# Patient Record
Sex: FEMALE | Race: BLACK OR AFRICAN AMERICAN | NOT HISPANIC OR LATINO | URBAN - METROPOLITAN AREA
[De-identification: names, ages, dates, MRNs, and addresses within clinical notes are randomized per-mention and may not be internally consistent; named-entity substitution may affect disease eponyms.]

---

## 2023-09-07 ENCOUNTER — HOSPITAL ENCOUNTER (EMERGENCY)
Facility: HOSPITAL | Age: 23
Discharge: HOME | End: 2023-09-08
Attending: EMERGENCY MEDICINE
Payer: MEDICARE

## 2023-09-07 DIAGNOSIS — E86.0 DEHYDRATION: Primary | ICD-10-CM

## 2023-09-07 DIAGNOSIS — R00.0 TACHYCARDIA: ICD-10-CM

## 2023-09-07 LAB
ALBUMIN SERPL BCP-MCNC: 4.4 G/DL (ref 3.5–5.2)
ALP SERPL-CCNC: 145 U/L (ref 55–135)
ALT SERPL W/O P-5'-P-CCNC: 8 U/L (ref 10–44)
ANION GAP SERPL CALC-SCNC: 12 MMOL/L (ref 8–16)
AST SERPL-CCNC: 15 U/L (ref 10–40)
BASOPHILS # BLD AUTO: 0.02 K/UL (ref 0–0.2)
BASOPHILS NFR BLD: 0.2 % (ref 0–1.9)
BILIRUB SERPL-MCNC: 0.3 MG/DL (ref 0.1–1)
BNP SERPL-MCNC: <10 PG/ML (ref 0–99)
BUN SERPL-MCNC: 9 MG/DL (ref 6–20)
CALCIUM SERPL-MCNC: 9.5 MG/DL (ref 8.7–10.5)
CHLORIDE SERPL-SCNC: 108 MMOL/L (ref 95–110)
CO2 SERPL-SCNC: 18 MMOL/L (ref 23–29)
CREAT SERPL-MCNC: 0.8 MG/DL (ref 0.5–1.4)
DIFFERENTIAL METHOD: ABNORMAL
EOSINOPHIL # BLD AUTO: 0 K/UL (ref 0–0.5)
EOSINOPHIL NFR BLD: 0.2 % (ref 0–8)
ERYTHROCYTE [DISTWIDTH] IN BLOOD BY AUTOMATED COUNT: 12.2 % (ref 11.5–14.5)
EST. GFR  (NO RACE VARIABLE): >60 ML/MIN/1.73 M^2
GLUCOSE SERPL-MCNC: 96 MG/DL (ref 70–110)
HCT VFR BLD AUTO: 34.8 % (ref 37–48.5)
HGB BLD-MCNC: 11.4 G/DL (ref 12–16)
IMM GRANULOCYTES # BLD AUTO: 0.04 K/UL (ref 0–0.04)
IMM GRANULOCYTES NFR BLD AUTO: 0.4 % (ref 0–0.5)
LYMPHOCYTES # BLD AUTO: 0.7 K/UL (ref 1–4.8)
LYMPHOCYTES NFR BLD: 8 % (ref 18–48)
MCH RBC QN AUTO: 28.1 PG (ref 27–31)
MCHC RBC AUTO-ENTMCNC: 32.8 G/DL (ref 32–36)
MCV RBC AUTO: 86 FL (ref 82–98)
MONOCYTES # BLD AUTO: 0.8 K/UL (ref 0.3–1)
MONOCYTES NFR BLD: 9.1 % (ref 4–15)
NEUTROPHILS # BLD AUTO: 7.3 K/UL (ref 1.8–7.7)
NEUTROPHILS NFR BLD: 82.1 % (ref 38–73)
NRBC BLD-RTO: 0 /100 WBC
PLATELET # BLD AUTO: 302 K/UL (ref 150–450)
PMV BLD AUTO: 9.1 FL (ref 9.2–12.9)
POTASSIUM SERPL-SCNC: 3.6 MMOL/L (ref 3.5–5.1)
PROT SERPL-MCNC: 7.4 G/DL (ref 6–8.4)
RBC # BLD AUTO: 4.05 M/UL (ref 4–5.4)
SODIUM SERPL-SCNC: 138 MMOL/L (ref 136–145)
TROPONIN I SERPL DL<=0.01 NG/ML-MCNC: 0.01 NG/ML (ref 0–0.03)
WBC # BLD AUTO: 8.89 K/UL (ref 3.9–12.7)

## 2023-09-07 PROCEDURE — 99285 EMERGENCY DEPT VISIT HI MDM: CPT | Mod: 25

## 2023-09-07 PROCEDURE — 93010 EKG 12-LEAD: ICD-10-PCS | Mod: ,,, | Performed by: INTERNAL MEDICINE

## 2023-09-07 PROCEDURE — 83880 ASSAY OF NATRIURETIC PEPTIDE: CPT | Performed by: NURSE PRACTITIONER

## 2023-09-07 PROCEDURE — 84484 ASSAY OF TROPONIN QUANT: CPT | Performed by: NURSE PRACTITIONER

## 2023-09-07 PROCEDURE — 80053 COMPREHEN METABOLIC PANEL: CPT | Performed by: NURSE PRACTITIONER

## 2023-09-07 PROCEDURE — 93005 ELECTROCARDIOGRAM TRACING: CPT

## 2023-09-07 PROCEDURE — 85025 COMPLETE CBC W/AUTO DIFF WBC: CPT | Performed by: NURSE PRACTITIONER

## 2023-09-07 PROCEDURE — 93010 ELECTROCARDIOGRAM REPORT: CPT | Mod: ,,, | Performed by: INTERNAL MEDICINE

## 2023-09-07 NOTE — Clinical Note
Freddy accompanied their spouse to the emergency department on 9/7/2023. They may return to work on 09/09/2023.      If you have any questions or concerns, please don't hesitate to call.      Adilia Renteria RN

## 2023-09-08 VITALS
TEMPERATURE: 99 F | WEIGHT: 105.81 LBS | OXYGEN SATURATION: 100 % | SYSTOLIC BLOOD PRESSURE: 135 MMHG | HEIGHT: 61 IN | BODY MASS INDEX: 19.98 KG/M2 | RESPIRATION RATE: 18 BRPM | DIASTOLIC BLOOD PRESSURE: 88 MMHG | HEART RATE: 109 BPM

## 2023-09-08 PROCEDURE — 96360 HYDRATION IV INFUSION INIT: CPT

## 2023-09-08 PROCEDURE — 25000003 PHARM REV CODE 250: Performed by: EMERGENCY MEDICINE

## 2023-09-08 RX ADMIN — SODIUM CHLORIDE 1000 ML: 0.9 INJECTION, SOLUTION INTRAVENOUS at 01:09

## 2023-09-08 RX ADMIN — SODIUM CHLORIDE 1000 ML: 9 INJECTION, SOLUTION INTRAVENOUS at 12:09

## 2023-09-08 NOTE — ED PROVIDER NOTES
SCRIBE #1 NOTE: I, Ángel East, am scribing for, and in the presence of, Hakan Simms Jr., MD. I have scribed the entire note.       History     Chief Complaint   Patient presents with    Tachycardia     Pt states she is having fast heart rate and shortness of breath and chest pressure when she walks. Noted tachycardia in triage. Pt is S/P kidney transplant this year April 29. Pt denies fevers, and others symptoms.      Review of patient's allergies indicates:  No Known Allergies      History of Present Illness     HPI    9/7/2023, 11:52 PM  History obtained from the patient      History of Present Illness: Bertha Musa is a 23 y.o. female patient with a PMHx of kidney transplant who presents to the Emergency Department for evaluation of palpitations which onset gradually tonight around 2000. Symptoms are constant and moderate in severity. No mitigating or exacerbating factors reported. No associated sxs reported. Patient denies any CP, cough, leg swelling, abdominal pain, fever, and all other sxs at this time. No prior Tx reported. No further complaints or concerns at this time.       Arrival mode: Personal vehicle     PCP: No primary care provider on file.        Past Medical History:  No past medical history on file.    Past Surgical History:  No past surgical history on file.      Family History:  No family history on file.    Social History:  Social History     Tobacco Use    Smoking status: Not on file    Smokeless tobacco: Not on file   Substance and Sexual Activity    Alcohol use: Not on file    Drug use: Not on file    Sexual activity: Not on file        Review of Systems     Review of Systems   Constitutional:  Negative for fever.   HENT:  Negative for sore throat.    Respiratory:  Negative for cough.    Cardiovascular:  Positive for palpitations. Negative for chest pain and leg swelling.   Gastrointestinal:  Negative for abdominal pain and nausea.   Genitourinary:  Negative for dysuria.  "  Musculoskeletal:  Negative for back pain.   Skin:  Negative for rash.   Neurological:  Negative for weakness.   Hematological:  Does not bruise/bleed easily.   All other systems reviewed and are negative.     Physical Exam     Initial Vitals [09/07/23 2143]   BP Pulse Resp Temp SpO2   (!) 149/80 (!) 139 18 98.5 °F (36.9 °C) 100 %      MAP       --          Physical Exam  Nursing Notes and Vital Signs Reviewed.  Constitutional: Patient is in no acute distress. Well-developed and well-nourished.  Head: Atraumatic. Normocephalic.  Eyes:  EOM intact.  No scleral icterus.  ENT: Mucous membranes are moist.  Nares clear   Neck:  Full ROM. No JVD.  Cardiovascular: Tachycardic. Regular rhythm. No murmurs, rubs, or gallops. Distal pulses are 2+ and symmetric  Pulmonary/Chest: No respiratory distress. Clear to auscultation bilaterally. No wheezing or rales.  Equal chest wall rise bilaterally  Abdominal: Soft and non-distended.  There is no tenderness.  No rebound, guarding, or rigidity. Good bowel sounds.  Genitourinary: No CVA tenderness.  No suprapubic tenderness  Musculoskeletal: Moves all extremities. No obvious deformities.  5 x 5 strength in all extremities   Skin: Warm and dry.  Neurological:  Alert, awake, and appropriate.  Normal speech.  No acute focal neurological deficits are appreciated.  Two through 12 intact bilaterally.  Psychiatric: Normal affect. Good eye contact. Appropriate in content.   ED Course   Procedures  ED Vital Signs:  Vitals:    09/07/23 2143 09/07/23 2344 09/07/23 2347 09/07/23 2349   BP: (!) 149/80 (!) 143/83 (!) 157/89 (!) 148/95   Pulse: (!) 139 (!) 125 (!) 134 (!) 137   Resp: 18 18 18 18   Temp: 98.5 °F (36.9 °C)      TempSrc: Oral      SpO2: 100% 100% 100% 100%   Weight: 48 kg (105 lb 13.1 oz)      Height: 5' 1" (1.549 m)       09/07/23 2353 09/08/23 0036 09/08/23 0102   BP:  (!) 135/92 135/88   Pulse: (!) 123 (!) 114 109   Resp:  20 18   Temp:      TempSrc:      SpO2:  100% 100%   Weight: "      Height:          Abnormal Lab Results:  Labs Reviewed   CBC W/ AUTO DIFFERENTIAL - Abnormal; Notable for the following components:       Result Value    Hemoglobin 11.4 (*)     Hematocrit 34.8 (*)     MPV 9.1 (*)     Lymph # 0.7 (*)     Gran % 82.1 (*)     Lymph % 8.0 (*)     All other components within normal limits   COMPREHENSIVE METABOLIC PANEL - Abnormal; Notable for the following components:    CO2 18 (*)     Alkaline Phosphatase 145 (*)     ALT 8 (*)     All other components within normal limits   TROPONIN I   B-TYPE NATRIURETIC PEPTIDE        All Lab Results:  Results for orders placed or performed during the hospital encounter of 09/07/23   CBC auto differential   Result Value Ref Range    WBC 8.89 3.90 - 12.70 K/uL    RBC 4.05 4.00 - 5.40 M/uL    Hemoglobin 11.4 (L) 12.0 - 16.0 g/dL    Hematocrit 34.8 (L) 37.0 - 48.5 %    MCV 86 82 - 98 fL    MCH 28.1 27.0 - 31.0 pg    MCHC 32.8 32.0 - 36.0 g/dL    RDW 12.2 11.5 - 14.5 %    Platelets 302 150 - 450 K/uL    MPV 9.1 (L) 9.2 - 12.9 fL    Immature Granulocytes 0.4 0.0 - 0.5 %    Gran # (ANC) 7.3 1.8 - 7.7 K/uL    Immature Grans (Abs) 0.04 0.00 - 0.04 K/uL    Lymph # 0.7 (L) 1.0 - 4.8 K/uL    Mono # 0.8 0.3 - 1.0 K/uL    Eos # 0.0 0.0 - 0.5 K/uL    Baso # 0.02 0.00 - 0.20 K/uL    nRBC 0 0 /100 WBC    Gran % 82.1 (H) 38.0 - 73.0 %    Lymph % 8.0 (L) 18.0 - 48.0 %    Mono % 9.1 4.0 - 15.0 %    Eosinophil % 0.2 0.0 - 8.0 %    Basophil % 0.2 0.0 - 1.9 %    Differential Method Automated    Comprehensive metabolic panel   Result Value Ref Range    Sodium 138 136 - 145 mmol/L    Potassium 3.6 3.5 - 5.1 mmol/L    Chloride 108 95 - 110 mmol/L    CO2 18 (L) 23 - 29 mmol/L    Glucose 96 70 - 110 mg/dL    BUN 9 6 - 20 mg/dL    Creatinine 0.8 0.5 - 1.4 mg/dL    Calcium 9.5 8.7 - 10.5 mg/dL    Total Protein 7.4 6.0 - 8.4 g/dL    Albumin 4.4 3.5 - 5.2 g/dL    Total Bilirubin 0.3 0.1 - 1.0 mg/dL    Alkaline Phosphatase 145 (H) 55 - 135 U/L    AST 15 10 - 40 U/L    ALT 8  (L) 10 - 44 U/L    eGFR >60 >60 mL/min/1.73 m^2    Anion Gap 12 8 - 16 mmol/L   Troponin I   Result Value Ref Range    Troponin I 0.008 0.000 - 0.026 ng/mL   Brain natriuretic peptide   Result Value Ref Range    BNP <10 0 - 99 pg/mL       Imaging Results:  Imaging Results              X-Ray Chest 1 View (Final result)  Result time 09/07/23 22:05:27      Final result by Luis Alberto Atkins MD (09/07/23 22:05:27)                   Impression:      No acute abnormality.      Electronically signed by: Luis Alberto Atkins  Date:    09/07/2023  Time:    22:05               Narrative:    EXAMINATION:  XR CHEST 1 VIEW    CLINICAL HISTORY:  Tachycardia, unspecified    TECHNIQUE:  Single frontal view of the chest was performed.    COMPARISON:  None    FINDINGS:  The lungs are clear, with normal appearance of pulmonary vasculature and no pleural effusion or pneumothorax.    The cardiac silhouette is normal in size. The hilar and mediastinal contours are unremarkable.    Bones are intact.                                       The EKG was ordered, reviewed, and independently interpreted by the ED provider.  Interpretation time: 21:47 SEP-7-2023  Rate: 121 BPM  Rhythm: sinus tachycardia  Interpretation: No acute ST changes. No STEMI.         The Emergency Provider reviewed the vital signs and test results, which are outlined above.     ED Discussion     1:45 AM: Reassessed pt at this time. Discussed with pt all pertinent ED information and results. Discussed pt dx and plan of tx. Gave pt all f/u and return to the ED instructions. All questions and concerns were addressed at this time. Pt expresses understanding of information and instructions, and is comfortable with plan to discharge. Pt is stable for discharge.    I discussed with patient and/or family/caretaker that evaluation in the ED does not suggest any emergent or life threatening medical conditions requiring immediate intervention beyond what was provided in the ED, and I  believe patient is safe for discharge.  Regardless, an unremarkable evaluation in the ED does not preclude the development or presence of a serious of life threatening condition. As such, patient was instructed to return immediately for any worsening or change in current symptoms.         Medical Decision Making  Differential diagnosis: Dehydration, palpitations, renal failure, acute organ rejection    Patient presented with palpitations and feeling like her heart is racing.  History kidney transplant from [facility].  Had a prior presentation to a different facility for similar symptoms.  Patient was experiencing chest pain.  Patient's workup was undertaken fluid was given.  Her tachycardia resolved with fluids.  Patient clinically does not have a DVT or PE as her pulse rate improved.  Workup was negative.  Patient was stable safe for discharge after IV fluids in my opinion.  Discussed with her all findings well as plan of care.  She verbalized agreement understanding with all seems reliable she is safe for discharge in my opinion.    Amount and/or Complexity of Data Reviewed  External Data Reviewed: labs, ECG and notes.     Details: I reviewed the patient's prior notes.  Patient has a kidney transplant from [facility].  EKGs been reviewed as well as her prior history of dehydration requiring IV fluids with similar presentation.  Labs: ordered. Decision-making details documented in ED Course.     Details: BNP and troponin are normal.  CMP shows no renal failure.  Normal white count nearly normal H&H.  Radiology: ordered. Decision-making details documented in ED Course.     Details: Chest x-ray shows no acute abnormality  ECG/medicine tests: ordered and independent interpretation performed. Decision-making details documented in ED Course.     Details: Sinus tachycardia rate of 121 no STEMI    Risk  Decision regarding hospitalization.  Diagnosis or treatment significantly limited by social determinants of health.                 ED Medication(s):  Medications   sodium chloride 0.9% bolus 1,000 mL 1,000 mL (0 mLs Intravenous Stopped 9/8/23 0124)   sodium chloride 0.9% bolus 1,000 mL 1,000 mL (0 mLs Intravenous Stopped 9/8/23 0159)       There are no discharge medications for this patient.       Follow-up Information       PCP In 1 day.                                 Scribe Attestation:   Scribe #1: I performed the above scribed service and the documentation accurately describes the services I performed. I attest to the accuracy of the note.     Attending:   Physician Attestation Statement for Scribe #1: I, Hakan Simms Jr., MD, personally performed the services described in this documentation, as scribed by Ángel East, in my presence, and it is both accurate and complete.           Clinical Impression       ICD-10-CM ICD-9-CM   1. Dehydration  E86.0 276.51   2. Tachycardia  R00.0 785.0       Disposition:   Disposition: Discharged  Condition: Stable         Hakan Simms Jr., MD  09/09/23 3288

## 2023-09-08 NOTE — FIRST PROVIDER EVALUATION
"Medical screening examination initiated.  I have conducted a focused provider triage encounter, findings are as follows:    Brief history of present illness:  Patient presents complaining of tachycardia and hypotension.  States that she gets short of breath whenever she stands up.  History of kidney transplant.    Vitals:    09/07/23 2143   BP: (!) 149/80   BP Location: Right arm   Patient Position: Sitting   Pulse: (!) 139   Resp: 18   Temp: 98.5 °F (36.9 °C)   TempSrc: Oral   SpO2: 100%   Weight: 48 kg (105 lb 13.1 oz)   Height: 5' 1" (1.549 m)       Pertinent physical exam:  Tachycardic    Brief workup plan:  Labs, EKG    Preliminary workup initiated; this workup will be continued and followed by the physician or advanced practice provider that is assigned to the patient when roomed.  "